# Patient Record
Sex: FEMALE | Race: WHITE | Employment: FULL TIME | URBAN - METROPOLITAN AREA
[De-identification: names, ages, dates, MRNs, and addresses within clinical notes are randomized per-mention and may not be internally consistent; named-entity substitution may affect disease eponyms.]

---

## 2022-09-13 ENCOUNTER — TELEMEDICINE (OUTPATIENT)
Dept: NEUROLOGY | Age: 47
End: 2022-09-13
Payer: MEDICARE

## 2022-09-13 DIAGNOSIS — R29.898 WEAKNESS OF BOTH LEGS: ICD-10-CM

## 2022-09-13 DIAGNOSIS — Z11.59 ENCOUNTER FOR SCREENING FOR OTHER VIRAL DISEASES: ICD-10-CM

## 2022-09-13 DIAGNOSIS — G35 MULTIPLE SCLEROSIS (HCC): Primary | ICD-10-CM

## 2022-09-13 DIAGNOSIS — G40.409 GRAND MAL SEIZURE (HCC): ICD-10-CM

## 2022-09-13 PROCEDURE — 99204 OFFICE O/P NEW MOD 45 MIN: CPT | Performed by: PSYCHIATRY & NEUROLOGY

## 2022-09-13 PROCEDURE — G8428 CUR MEDS NOT DOCUMENT: HCPCS | Performed by: PSYCHIATRY & NEUROLOGY

## 2022-09-13 RX ORDER — DIVALPROEX SODIUM 500 MG/1
500 TABLET, DELAYED RELEASE ORAL 2 TIMES DAILY
COMMUNITY
Start: 2022-09-09

## 2022-09-13 RX ORDER — GABAPENTIN 600 MG/1
600 TABLET ORAL 3 TIMES DAILY
COMMUNITY

## 2022-09-13 RX ORDER — RISPERIDONE 0.25 MG/1
0.25 TABLET, FILM COATED ORAL 2 TIMES DAILY
COMMUNITY
Start: 2022-09-06

## 2022-09-13 RX ORDER — DULOXETIN HYDROCHLORIDE 60 MG/1
60 CAPSULE, DELAYED RELEASE ORAL 2 TIMES DAILY
COMMUNITY

## 2022-09-13 NOTE — Clinical Note
Tricia Figueroa, see check out  Labs request pls mail to pt or she can pick them up then go to labs downstairs, need fasting.

## 2022-09-13 NOTE — PROGRESS NOTES
Kurtis Mann (:  1975) is a New patient, here for evaluation of the following:    Assessment & Plan   Below is the assessment and plan developed based on review of pertinent history, physical exam, labs, studies, and medications. 1. Multiple sclerosis ([facility] [address]  -     MRI CERVICAL SPINE W WO CONTRAST; Future  -     MRI THORACIC SPINE W WO CONTRAST; Future  -     CBC with Auto Differential; Future  -     Comprehensive Metabolic Panel; Future  -     HIV 1/2 Ag/Ab, 4TH Generation,W Rflx Confirm; Future  -     Varicella Zoster Antibody, IgG; Future  -     Hepatitis B Core Antibody, Total; Future  -     IgG, IgA, IgM; Future  -     Vitamin D 25 Hydroxy; Future  -     Vitamin B12; Future  -     CK; Future  2. Grand mal seizure ([facility] [address]  3. Weakness of both legs  -     MRI CERVICAL SPINE W WO CONTRAST; Future  -     MRI THORACIC SPINE W WO CONTRAST; Future  -     CK; Future  4. Encounter for screening for other viral diseases   -     Hepatitis B Core Antibody, Total; Future  Return in about 4 weeks (around 10/11/2022) for EEG. Due to severe pain likely MS related central pain episodes patient would like to start MS DMA (disease modifying agent). MRI whole spine, labs, appt in office for exam. EKG, eye exam for macular edema, TB test and age related cancer screen. 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 Past 23 years, intermittent paresthesia but no weakness, time when she cut herself's finger without feeling. Tried Avenox not able to tolerate side effect of flu like symptoms, then switched to Copaxone which gave her burning sensation in injection site. Stopped copaxone in . Reported symptoms of pain, severe in degree for that she takes gabapentin, both legs felt like running bicycles for hours.  [place]al [place] when standing from chair, walking felt like from excessive weight. Numbness tingling in finger tips and toes occurring either side. No diabetes. FH her grandmother had MS. New onset grand mal seizure longer than 45 min and lost heart rate, requiring intubation in hospital last December. Taking Depakote 500 mg bid. Had 4 recurrent seizures since, last one was in June 2022. Also on Cymbalta and Risperdal for depression. Memory loss since then. Last eye exam 2 months ago, Rx of lenses changed. No optic neuritis or macular disease reported to . [facility] MRI brain-  INDICATION: New onset seizure   COMPARISON: Cranial CT 12/11/2021 and MRI brain 4/9/2013     TECHNIQUE: Multiplanar, multisequence MR imaging of the brain was performed before and after the intravenous administration of 13 mL Gadavist.  Total of 19 series obtained     FINDINGS:   .  Brain and CSF Spaces: There is no evidence of restricted diffusion or susceptibility weighted signal void to suggest acute ischemic insult or blood product deposition from prior intraparenchymal hemorrhage. Scattered nonspecific FLAIR T2 high signal intensity foci are seen within the cerebral white matter. These have increased in number and extent since the prior examination. Findings can be secondary to broad of etiologies but most commonly related to small vessel ischemic insults. No evidence of abnormal intracranial mineralization or remote blood product deposition. No evidence of abnormal intracranial enhancement. .  Vasculature: Flow-related signal is maintained in the major intracranial arteries and dural venous sinuses. .  Sinonasal: Paranasal sinuses are aerated. Adjacent soft tissues of the face and orbits appear unremarkable. .  Cranium and extracranial soft tissues: No marrow edema or replacement. Mastoids are aerated. Size and contour of the pituitary gland are within normal limits. IMPRESSION:     1. Nonspecific white matter lesions have increased in size and number.  No areas of pathologic enhancement identified to suggest likely active inflammatory or neoplastic disease. 2. Brain parenchyma otherwise demonstrates no findings particularly suspicious for site of seizure focus. Signed by: 12/13/2021 9:06 PM: Pierre Bishop  Specimen Collected: 12/13/21 20:59        Review of Systems   Musculoskeletal:  Positive for falls. Neurological:  Positive for seizures and weakness. Objective   Patient-Reported Vitals  No data recorded     Physical Exam  [INSTRUCTIONS:  \"[x]\" Indicates a positive item  \"[]\" Indicates a negative item  -- DELETE ALL ITEMS NOT EXAMINED]    Constitutional: [x] Appears well-developed and well-nourished [x] No apparent distress      [] Abnormal -     Mental status: [x] Alert and awake  [x] Oriented to person/place/time [x] Able to follow commands. Provided clear history, memory capacity was normal, no dysphasia.      [] Abnormal -     Eyes:   EOM    [x]  Normal    [] Abnormal -   Sclera  [x]  Normal    [] Abnormal -          Discharge [x]  None visible   [] Abnormal -     HENT: [x] Normocephalic, atraumatic  [] Abnormal - Mouth/Throat: Mucous membranes are moist    External Ears [x] Normal hearing [] Abnormal -    Neck: [x] s [] Abnormal -     Pulmonary/Chest: [x] Respiratory effort normal   [x] No visualized signs of difficulty breathing or respiratory distress        [] Abnormal -      Musculoskeletal:   [x] Normal gait with no signs of ataxia - not checked today        [x] Normal range of motion of neck        [] Abnormal -     Neurological:        [x] No Facial Asymmetry (Cranial nerve 7 motor function) (limited exam due to video visit)          [x] No gaze palsy        [] Abnormal -          Skin:        [x]         [] Abnormal -            Psychiatric:       [x] Normal Affect [] Abnormal -        [x] No Hallucinations    Other pertinent observable physical exam findings:-         On this date 9/13/2022 I have spent 45 minutes reviewing previous notes, test results and face to face (virtual) with the patient discussing the diagnosis and importance of compliance with the treatment plan as well as documenting on the day of the visit. Laxmi Westbrook, was evaluated through a synchronous (real-time) audio-video encounter. The patient (or guardian if applicable) is aware that this is a billable service, which includes applicable co-pays. This Virtual Visit was conducted with patient's (and/or legal guardian's) consent. The visit was conducted pursuant to the emergency declaration under the [address] [place] [address], [address] [place] [address] and the [employer] General Act. Patient identification was verified, and a caregiver was present when appropriate. The patient was located at Home: [address]    Provider was located at Home ([address]): Pola Pablo MD

## 2022-10-10 ENCOUNTER — OFFICE VISIT (OUTPATIENT)
Dept: NEUROLOGY | Age: 47
End: 2022-10-10
Payer: MEDICARE

## 2022-10-10 VITALS
WEIGHT: 281.8 LBS | HEIGHT: 67 IN | HEART RATE: 101 BPM | DIASTOLIC BLOOD PRESSURE: 91 MMHG | BODY MASS INDEX: 44.23 KG/M2 | SYSTOLIC BLOOD PRESSURE: 134 MMHG

## 2022-10-10 DIAGNOSIS — G35 MULTIPLE SCLEROSIS (HCC): Primary | ICD-10-CM

## 2022-10-10 DIAGNOSIS — G40.409 GRAND MAL SEIZURE (HCC): ICD-10-CM

## 2022-10-10 PROCEDURE — 99215 OFFICE O/P EST HI 40 MIN: CPT | Performed by: PSYCHIATRY & NEUROLOGY

## 2022-10-10 PROCEDURE — G8427 DOCREV CUR MEDS BY ELIG CLIN: HCPCS | Performed by: PSYCHIATRY & NEUROLOGY

## 2022-10-10 PROCEDURE — G8417 CALC BMI ABV UP PARAM F/U: HCPCS | Performed by: PSYCHIATRY & NEUROLOGY

## 2022-10-10 PROCEDURE — 4004F PT TOBACCO SCREEN RCVD TLK: CPT | Performed by: PSYCHIATRY & NEUROLOGY

## 2022-10-10 PROCEDURE — G8484 FLU IMMUNIZE NO ADMIN: HCPCS | Performed by: PSYCHIATRY & NEUROLOGY

## 2022-10-10 RX ORDER — DEXTROAMPHETAMINE SACCHARATE, AMPHETAMINE ASPARTATE, DEXTROAMPHETAMINE SULFATE AND AMPHETAMINE SULFATE 7.5; 7.5; 7.5; 7.5 MG/1; MG/1; MG/1; MG/1
30 TABLET ORAL 2 TIMES DAILY
COMMUNITY
Start: 2022-09-16

## 2022-10-10 RX ORDER — TRAMADOL HYDROCHLORIDE 50 MG/1
50 TABLET ORAL EVERY 6 HOURS PRN
COMMUNITY
Start: 2022-10-06

## 2022-10-10 ASSESSMENT — ENCOUNTER SYMPTOMS
SPUTUM PRODUCTION: 1
COUGH: 1
BACK PAIN: 1
BLURRED VISION: 1
SHORTNESS OF BREATH: 1
GASTROINTESTINAL NEGATIVE: 1

## 2022-10-10 ASSESSMENT — VISUAL ACUITY: VA_NORMAL: 1

## 2022-10-10 NOTE — PROGRESS NOTES
10/10/2022  Humera Corrales 49 y.o. female      Chief Complaint:  Chief Complaint   Patient presents with    Follow-up     Left leg pain-US not scheduled for 2 more weeks    Multiple Sclerosis          Followup Note:   All labs and MRI - not been contacted, not done yet. Right after last visit she got COVID infection, still has SOB. Currently c/o left leg pain pending DVT screening. MS related symptoms, pt reported - paresthesia in fingers and feet, numbness of soles, color turned to red. No neck or back pain. No diabetes. Has B12 deficiency. Takes B12 injection weekly x 3 months now. Previous MS DMA Avenox then Copaxone, stopped due to side effect in injection sites. Chronic bilateral leg pain and heaviness. No seizure reported since last visit. On Depakote, plan to switch it to other kinds due to overweight. Review Test Results: I have reviewed imaging study and lab tests- pending. Current Outpatient Medications   Medication Sig Dispense Refill    amphetamine-dextroamphetamine (ADDERALL) 30 MG tablet Take 30 mg by mouth in the morning and at bedtime. traMADol (ULTRAM) 50 MG tablet Take 50 mg by mouth every 6 hours as needed. Multiple Vitamins-Minerals (CENTRUM WOMEN PO) Take 1 tablet by mouth daily      DULoxetine (CYMBALTA) 60 MG extended release capsule Take 60 mg by mouth 2 times daily      gabapentin (NEURONTIN) 600 MG tablet Take 600 mg by mouth 3 times daily. risperiDONE (RISPERDAL) 0.25 MG tablet Take 0.25 mg by mouth in the morning and at bedtime      divalproex (DEPAKOTE) 500 MG DR tablet Take 500 mg by mouth in the morning and at bedtime       No current facility-administered medications for this visit.         Allergies   Allergen Reactions    Erythromycin Nausea And Vomiting, Hives and Shortness Of Breath    Penicillins Hives and Shortness Of Breath     Has not had since childhood      Abilify [Aripiprazole] Swelling     Severe      Prochlorperazine Other (See Comments)     seizures           Review of Systems:  Review of Systems   Constitutional:  Positive for chills, fever and malaise/fatigue. HENT:  Positive for tinnitus. Eyes:  Positive for blurred vision. Respiratory:  Positive for cough, sputum production and shortness of breath. Cardiovascular:  Positive for chest pain and leg swelling. Gastrointestinal: Negative. Genitourinary: Negative. Musculoskeletal:  Positive for back pain, falls, joint pain, myalgias and neck pain. Skin: Negative. Neurological:  Positive for dizziness, tingling, speech change and weakness. Endo/Heme/Allergies:  Bruises/bleeds easily. Psychiatric/Behavioral:  Positive for depression and memory loss. The patient is nervous/anxious. Examination:  Vitals:    10/10/22 1612   BP: (!) 134/91   Pulse: (!) 101   Weight: 281 lb 12.8 oz (127.8 kg)   Height: 5' 7\" (1.702 m)        Physical Exam  Constitutional:       Appearance: She is obese. HENT:      Head: Normocephalic. Eyes:      Extraocular Movements: Extraocular movements intact and EOM normal.      Conjunctiva/sclera: Conjunctivae normal.      Pupils: Pupils are equal, round, and reactive to light. Cardiovascular:      Rate and Rhythm: Tachycardia present. Pulmonary:      Comments: Some SOB  Musculoskeletal:         General: Normal range of motion. Cervical back: Normal range of motion. Skin:     General: Skin is warm and dry. Neurological:      Mental Status: She is alert and oriented to person, place, and time. Mental status is at baseline. Cranial Nerves: No cranial nerve deficit. Sensory: Sensory deficit present. Coordination: Coordination normal. Finger-Nose-Finger Test and Romberg Test normal.      Gait: Gait is intact. Gait normal.      Deep Tendon Reflexes: Reflexes normal.      Reflex Scores:       Tricep reflexes are 2+ on the right side and 2+ on the left side.        Bicep reflexes are 2+ on the right side and 2+ on the left side. Brachioradialis reflexes are 2+ on the right side and 2+ on the left side. Patellar reflexes are 2+ on the right side and 2+ on the left side. Achilles reflexes are 2+ on the right side and 2+ on the left side. Psychiatric:         Mood and Affect: Mood normal.         Speech: Speech normal.         Behavior: Behavior normal.         Thought Content: Thought content normal.         Judgment: Judgment normal.        Neurologic Exam     Mental Status   Oriented to person, place, and time. Concentration: normal.   Speech: speech is normal   Level of consciousness: alert  Knowledge: good. Normal comprehension. Cranial Nerves     CN II   Visual fields full to confrontation. Visual acuity: normal  Right visual field deficit: none  Left visual field deficit: none     CN III, IV, VI   Pupils are equal, round, and reactive to light. Extraocular motions are normal.   Right pupil: Size: 3 mm. Shape: regular. Reactivity: brisk. Left pupil: Size: 3 mm. Shape: regular. Reactivity: brisk. Nystagmus: none   Diplopia: none  Ophthalmoparesis: none  Upgaze: normal    CN V   Facial sensation intact. CN VII   Facial expression full, symmetric. CN VIII   CN VIII normal.     CN IX, X   CN IX normal.   CN X normal.     CN XI   CN XI normal.     CN XII   CN XII normal.   Optokinetic nystagmus normal, no desat to red color. Motor Exam   Muscle bulk: normal  Overall muscle tone: normal  Right arm pronator drift: absent  Left arm pronator drift: absent    Strength   Strength 5/5 except as noted.    Left deltoid: 4/5  Right handed, finger tapping normal     Sensory Exam   Light touch normal.   Vibration normal.   Right arm pinprick: decreased from fingers  Left arm pinprick: decreased from fingers  Right leg pinprick: normal  Left leg pinprick: normal  Lateral 3 fingers distal     Gait, Coordination, and Reflexes     Gait  Gait: normal    Coordination   Romberg: negative  Finger to nose coordination: normal    Tremor   Resting tremor: absent  Intention tremor: absent  Action tremor: absent    Reflexes   Right brachioradialis: 2+  Left brachioradialis: 2+  Right biceps: 2+  Left biceps: 2+  Right triceps: 2+  Left triceps: 2+  Right patellar: 2+  Left patellar: 2+  Right achilles: 2+  Left achilles: 2+  Right plantar: normal  Left plantar: normal  Right Posada: absent  Left Posada: absent  Left F to N slightly sluggish when closed eyes. Gait slow and cautious. Assessment / Plan:    BODØ was seen today for follow-up and multiple sclerosis. Diagnoses and all orders for this visit:    Multiple sclerosis ([facility] [address]    [address] mal seizure [facility])     Patient will need to complete all the labs and MRIs, we will then sit down again for the discussion of oral MS disease modifying agent. 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 Patient has a pending DVT screening for left leg pain. We will need to discuss the possibility of changing Depakote to commonly used newer generation antiseizure medications due to the concern of side effect of gaining weight. I have spent 40 min, greater than 50% of discussing and counseling with patient, for treatment and diagnostic plan review.

## 2022-11-28 ENCOUNTER — OFFICE VISIT (OUTPATIENT)
Dept: NEUROLOGY | Age: 47
End: 2022-11-28
Payer: MEDICARE

## 2022-11-28 VITALS
DIASTOLIC BLOOD PRESSURE: 114 MMHG | SYSTOLIC BLOOD PRESSURE: 182 MMHG | HEIGHT: 67 IN | WEIGHT: 280 LBS | BODY MASS INDEX: 43.95 KG/M2 | HEART RATE: 108 BPM

## 2022-11-28 DIAGNOSIS — M79.605 LEFT LEG PAIN: ICD-10-CM

## 2022-11-28 DIAGNOSIS — R20.2 NUMBNESS AND TINGLING OF BOTH FEET: ICD-10-CM

## 2022-11-28 DIAGNOSIS — R20.0 NUMBNESS AND TINGLING OF BOTH FEET: ICD-10-CM

## 2022-11-28 DIAGNOSIS — G40.409 GRAND MAL SEIZURE (HCC): Primary | ICD-10-CM

## 2022-11-28 DIAGNOSIS — Z11.59 ENCOUNTER FOR SCREENING FOR OTHER VIRAL DISEASES: ICD-10-CM

## 2022-11-28 DIAGNOSIS — G35 MULTIPLE SCLEROSIS (HCC): ICD-10-CM

## 2022-11-28 DIAGNOSIS — G60.8 SENSORY POLYNEUROPATHY: ICD-10-CM

## 2022-11-28 LAB
BASOPHILS # BLD: 0.1 K/UL (ref 0–0.2)
BASOPHILS NFR BLD: 1 % (ref 0–2)
DIFFERENTIAL METHOD BLD: NORMAL
EOSINOPHIL # BLD: 0.3 K/UL (ref 0–0.8)
EOSINOPHIL NFR BLD: 3 % (ref 0.5–7.8)
ERYTHROCYTE [DISTWIDTH] IN BLOOD BY AUTOMATED COUNT: 13.8 % (ref 11.9–14.6)
HCT VFR BLD AUTO: 44.8 % (ref 35.8–46.3)
HGB BLD-MCNC: 14.3 G/DL (ref 11.7–15.4)
HIV 1+2 AB+HIV1 P24 AG SERPL QL IA: NONREACTIVE
HIV 1/2 RESULT COMMENT: NORMAL
IMM GRANULOCYTES # BLD AUTO: 0 K/UL (ref 0–0.5)
IMM GRANULOCYTES NFR BLD AUTO: 0 % (ref 0–5)
LYMPHOCYTES # BLD: 2.8 K/UL (ref 0.5–4.6)
LYMPHOCYTES NFR BLD: 30 % (ref 13–44)
MCH RBC QN AUTO: 29.1 PG (ref 26.1–32.9)
MCHC RBC AUTO-ENTMCNC: 31.9 G/DL (ref 31.4–35)
MCV RBC AUTO: 91.1 FL (ref 82–102)
MONOCYTES # BLD: 0.6 K/UL (ref 0.1–1.3)
MONOCYTES NFR BLD: 7 % (ref 4–12)
NEUTS SEG # BLD: 5.6 K/UL (ref 1.7–8.2)
NEUTS SEG NFR BLD: 59 % (ref 43–78)
NRBC # BLD: 0 K/UL (ref 0–0.2)
PLATELET # BLD AUTO: 379 K/UL (ref 150–450)
PMV BLD AUTO: 10.5 FL (ref 9.4–12.3)
RBC # BLD AUTO: 4.92 M/UL (ref 4.05–5.2)
TSH, 3RD GENERATION: 0.71 UIU/ML (ref 0.36–3.74)
VIT B12 SERPL-MCNC: 576 PG/ML (ref 193–986)
WBC # BLD AUTO: 9.3 K/UL (ref 4.3–11.1)

## 2022-11-28 PROCEDURE — 4004F PT TOBACCO SCREEN RCVD TLK: CPT | Performed by: PSYCHIATRY & NEUROLOGY

## 2022-11-28 PROCEDURE — G8417 CALC BMI ABV UP PARAM F/U: HCPCS | Performed by: PSYCHIATRY & NEUROLOGY

## 2022-11-28 PROCEDURE — 99215 OFFICE O/P EST HI 40 MIN: CPT | Performed by: PSYCHIATRY & NEUROLOGY

## 2022-11-28 PROCEDURE — G8484 FLU IMMUNIZE NO ADMIN: HCPCS | Performed by: PSYCHIATRY & NEUROLOGY

## 2022-11-28 PROCEDURE — G8428 CUR MEDS NOT DOCUMENT: HCPCS | Performed by: PSYCHIATRY & NEUROLOGY

## 2022-11-28 PROCEDURE — 95911 NRV CNDJ TEST 9-10 STUDIES: CPT | Performed by: PSYCHIATRY & NEUROLOGY

## 2022-11-28 PROCEDURE — 95886 MUSC TEST DONE W/N TEST COMP: CPT | Performed by: PSYCHIATRY & NEUROLOGY

## 2022-11-28 RX ORDER — DIVALPROEX SODIUM 500 MG/1
500 TABLET, DELAYED RELEASE ORAL 2 TIMES DAILY
Qty: 90 TABLET | Refills: 3 | Status: CANCELLED | OUTPATIENT
Start: 2022-11-28

## 2022-11-28 RX ORDER — DIVALPROEX SODIUM 500 MG/1
TABLET, EXTENDED RELEASE ORAL
Qty: 180 TABLET | Refills: 3 | Status: SHIPPED | OUTPATIENT
Start: 2022-11-28

## 2022-11-28 RX ORDER — LAMOTRIGINE 25 MG/1
TABLET ORAL
Qty: 200 TABLET | Refills: 3 | Status: SHIPPED | OUTPATIENT
Start: 2022-11-28

## 2022-11-28 ASSESSMENT — ENCOUNTER SYMPTOMS
EYE DISCHARGE: 0
PHOTOPHOBIA: 1
BACK PAIN: 0
EYE REDNESS: 0
SINUS PAIN: 0
RESPIRATORY NEGATIVE: 1
STRIDOR: 0
BLURRED VISION: 1
DOUBLE VISION: 1
GASTROINTESTINAL NEGATIVE: 1
SORE THROAT: 0
EYE PAIN: 0

## 2022-11-28 ASSESSMENT — VISUAL ACUITY: VA_NORMAL: 1

## 2022-11-28 NOTE — PROGRESS NOTES
[address], [address], [address] [facility]       Nerve Conduction Study and Electromyogram Report           Hx: 49 y.o. female with hx of MS presents with sudden onset L/LE severe pain with numbness and tingling of feet. Denies inury. Denies DM. Labs reprinted from previous appt and given to patient to complete. Clinical summary was reviewed. Neurological examination: Motor power showed weakness of the left leg. There was no atrophy. There were no fasciculations. Deep tendon reflexes were present and symmetrical at 2, neg babinski. Able to walk. Description: Nerve conduction studies were performed on the right lower extremity and left lower extremity, using standard technique. Bilateral sural response was normal.  Bilateral plantar showed reduced amplitudes 1.3 - 3.6 uV with normal CV but left lateral branch response was absent. Bilateral peroneal and tibial motor nerves were normal.  H reflex response was better formed on the left, 33.49 MS versus right 32.86 MS. wave latencies were normal 46.2 (R)-48.8 (L) MS. Needle electromyography was performed on the left lower extremity, using standard concentric electrodes. Results were normal.         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 Needle EMG to the left lower limb was normal showing no evidence of femoral/sciatic/peroneal neuropathy, L3/4/5/S1 radiculopathy or plexopathy. No myopathic process.            Procedure Details: Under procedure category          Kim Manzanares MD

## 2022-11-28 NOTE — PROGRESS NOTES
11/28/2022  Tessa Gtz 49 y.o. female      Chief Complaint:  Chief Complaint   Patient presents with    Multiple Sclerosis     Follow Up          Followup Note:   Patient was here for nerve conduction EMG for evaluation for left leg pain, additional time was spent for multiple sclerosis, seizure history reviewed and medication refill. Left leg pain 6 mo, worse for a month. Here for NCS EMG bilateral. Josef Luis off the scale today but able to walk, reported pain not escalated. Paresthesia of feet. Non diabetic. Onset of seizure a year ago, seizing for one hour and was admitted to [facility]. Principal Diagnosis   Generalized Seizure     Other Diagnoses   Patient Active Problem List   Diagnosis    Attention deficit hyperactivity disorder (ADHD), combined type    IBS (irritable bowel syndrome)    Multiple sclerosis ([facility] [address]       Diagnostic Studies Summary   MRI brain w/wo 12/13/2021  1. Nonspecific white matter lesions have increased in size and number. No areas of pathologic enhancement identified to suggest likely active inflammatory or neoplastic disease. 2. Brain parenchyma otherwise demonstrates no findings particularly suspicious for site of seizure focus. Consults   Neurocritical     Therapy Recommendations   PT: home with caregiver  OT: Home with caregiver     Hospital Course   History of Present Illness: This is a 51 y.o. female with a history of ADHD, Multiple sclerosis, bipolar disorder who was recently seen in the ED for a first time seizure on 8/13/21, and discharged with a prescription for depakote and with outpatient neurology referral presented to [facility] on 12/10/2021 after multiple seizing episodes. According to her , the patient had had multiple episodes of mood swings yesterday that was consistent with her BPD however this was more pronounced that he is used to.  He tried to comfort the patient when the patient went into a generalized seizure that marta approximately 10 - 15 minutes prior to EMS arrival. The patient had extension of her bilateral upper and lower extremities, with some foaming in her mouth. EMS gave the patient 5 mg IM versed and patient returned to her baseline en route to the ED. Just prior to arrival, she had another seizure episode and was apneic, which prompted EMS staff to oxygenate using BVM. Patient continued to be bagged in the ED and failed to return to baseline, and continued to have what appeared to be grand mal seizures with episodes of apnea, which prompted the ED physicians to intubate her for airway protection. Of note, patient had a neurology appointment scheduled for 9/1/2021 however the patient did not show up for this appointment according to her chart. Per , patient's daughter counted the number of tabs of depakote in her Bottle and noticed that the patient has not been taking it as instructed, however the patient is adamant she takes her medicine exactly as prescribed. She later redacted this statement saying she did not take her depakote. Hospital Course:     Epilepsy  Patient with history of epilepsy who was presented in suspected status epilepticus. First seizure diagnosed in 8/2020 and was initially started on depakote ER. Cites not taking this due to funding and having the medication stopped by her PCP. Restarted on depakote here and ultimately intubated possibly due to sedation with benzodiazepines for status epilepticus. Extubated and now without seizure like activity. Stable on PO depakote at the time of discharge. Will continue her on depakote ER 750mg BID outpatient but she will need outpatient follow up with neurology and to have her depakote level checked. Bipolar Disorder  Patient stable at this time without any mood disturbances. Stable on home meds this admission.       Respiratory Distress sp extubation  Patient required intubation initially most likely due to benzodiazepines vs status epilepticus. Extubated later that same day and tolerated weaning O2 well. Comfortable on room air at the time of discharge. Leukocytosis - resolved  Initially presented with elevated wbc count. This was thought to be due to her seizure and it was self limited. Wbc count was normal at the time of discharge. Review Test Results: I have reviewed imaging study and lab tests, discussed results with patient in detail- labs not yet completed. Will select part of labs for preparation of Ponesimod. Recent CBC CMP in normal range. Current Outpatient Medications   Medication Sig Dispense Refill    divalproex (DEPAKOTE ER) 500 MG extended release tablet 2 qhs. Depakote ER medically necessary due to compliance difficulties resulted in epi status. 180 tablet 3    lamoTRIgine (LAMICTAL) 25 MG tablet 1 qhs x 1 week, 1 bid x 1wk, 1 qam & 2 qhs x 1wk, then 2 bid, increase by 25 mg weekly. Call for Rx 100 mg to replace for 4 tabs of 25 mg when ready, once reach Lamictal 100 mg bid reduce depakote to 1 tab qhs. Continue to titrate lamictal to 150 mg bid final, by then discontinue depakote. 200 tablet 3    amphetamine-dextroamphetamine (ADDERALL) 30 MG tablet Take 30 mg by mouth in the morning and at bedtime. Multiple Vitamins-Minerals (CENTRUM WOMEN PO) Take 1 tablet by mouth daily      DULoxetine (CYMBALTA) 60 MG extended release capsule Take 60 mg by mouth 2 times daily      gabapentin (NEURONTIN) 600 MG tablet Take 600 mg by mouth 3 times daily. risperiDONE (RISPERDAL) 0.25 MG tablet Take 0.25 mg by mouth in the morning and at bedtime      traMADol (ULTRAM) 50 MG tablet Take 50 mg by mouth every 6 hours as needed. (Patient not taking: Reported on 11/28/2022)       No current facility-administered medications for this visit.         Allergies   Allergen Reactions    Erythromycin Nausea And Vomiting, Hives and Shortness Of Breath    Penicillins Hives and Shortness Of Breath     Has not had since childhood Abilify [Aripiprazole] Swelling     Severe      Prochlorperazine Other (See Comments)     seizures           Review of Systems:  Review of Systems   Constitutional:  Positive for malaise/fatigue. Negative for chills, diaphoresis, fever and weight loss. HENT:  Positive for hearing loss and tinnitus. Negative for congestion, ear discharge, ear pain, nosebleeds, sinus pain and sore throat. Eyes:  Positive for blurred vision, double vision and photophobia. Negative for pain, discharge and redness. Respiratory: Negative. Negative for stridor. Cardiovascular: Negative. Gastrointestinal: Negative. Genitourinary: Negative. Musculoskeletal:  Negative for back pain, joint pain, myalgias and neck pain. Falls: Pt fell from weight scale on 11/28/22 in office. Skin: Negative. Neurological:  Positive for dizziness, tingling, tremors, sensory change, weakness and headaches. Negative for speech change, focal weakness, seizures and loss of consciousness. Endo/Heme/Allergies:  Negative for environmental allergies and polydipsia. Bruises/bleeds easily. Psychiatric/Behavioral:  Positive for depression and memory loss. Negative for hallucinations, substance abuse and suicidal ideas. The patient has insomnia. The patient is not nervous/anxious. Examination:  Vitals:    11/28/22 1001 11/28/22 1002   BP: (!) 145/102 (!) 182/114   Site: Left Upper Arm Right Lower Arm   Position: Sitting Sitting   Pulse: (!) 108 (!) 108   Weight:  280 lb (127 kg)   Height:  5' 7\" (1.702 m)        Physical Exam  Vitals reviewed. Constitutional:       Appearance: She is obese. Eyes:      Extraocular Movements: Extraocular movements intact and EOM normal.      Conjunctiva/sclera: Conjunctivae normal.      Pupils: Pupils are equal, round, and reactive to light. Cardiovascular:      Rate and Rhythm: Normal rate.    Pulmonary:      Effort: Pulmonary effort is normal.   Musculoskeletal:         General: Normal range of motion. Cervical back: Normal range of motion. Skin:     General: Skin is warm and dry. Neurological:      Mental Status: She is alert and oriented to person, place, and time. Mental status is at baseline. Cranial Nerves: No cranial nerve deficit. Sensory: Sensory deficit present. Motor: Weakness present. Coordination: Coordination normal. Finger-Nose-Finger Test and Romberg Test normal.      Gait: Gait is intact. Gait normal.      Deep Tendon Reflexes:      Reflex Scores:       Tricep reflexes are 2+ on the right side and 2+ on the left side. Bicep reflexes are 2+ on the right side and 2+ on the left side. Brachioradialis reflexes are 2+ on the right side and 2+ on the left side. Patellar reflexes are 2+ on the right side and 2+ on the left side. Achilles reflexes are 2+ on the right side and 2+ on the left side. Comments: Left leg weakness giving with away component. No Babinski sign. Psychiatric:         Mood and Affect: Mood normal.         Speech: Speech normal.         Behavior: Behavior normal.         Thought Content: Thought content normal.         Judgment: Judgment normal.        Neurologic Exam     Mental Status   Oriented to person, place, and time. Concentration: normal.   Speech: speech is normal   Level of consciousness: alert  Knowledge: good. Normal comprehension. In tears due to the left pain     Cranial Nerves     CN II   Visual fields full to confrontation. Visual acuity: normal  Right visual field deficit: none  Left visual field deficit: none     CN III, IV, VI   Pupils are equal, round, and reactive to light. Extraocular motions are normal.   Right pupil: Size: 3 mm. Shape: regular. Left pupil: Size: 3 mm. Shape: regular. Nystagmus: none   Diplopia: none  Ophthalmoparesis: none  Upgaze: normal    CN V   Facial sensation intact. CN VII   Facial expression full, symmetric.      CN VIII   CN VIII normal.     CN IX, X CN IX normal.   CN X normal.     CN XI   CN XI normal.     CN XII   CN XII normal.     Motor Exam   Muscle bulk: normal  Overall muscle tone: normal  Right arm pronator drift: absent  Left arm pronator drift: absent    Strength   Strength 5/5 except as noted. Finger tapping normal bilaterally. Upper limbs strength normal; lower limbs testing with giving away component due to the pain. Sensory Exam   Right arm light touch: normal  Left arm light touch: normal  Right leg light touch: normal  Left leg light touch: decreased from knee  Straight leg testing positive on left. Gait, Coordination, and Reflexes     Gait  Gait: normal    Coordination   Romberg: negative  Finger to nose coordination: normal    Tremor   Resting tremor: absent  Intention tremor: absent  Action tremor: absent    Reflexes   Right brachioradialis: 2+  Left brachioradialis: 2+  Right biceps: 2+  Left biceps: 2+  Right triceps: 2+  Left triceps: 2+  Right patellar: 2+  Left patellar: 2+  Right achilles: 2+  Left achilles: 2+  Right plantar: normal  Left plantar: normal      Assessment / Plan:    Blue Melgar was seen today for multiple sclerosis. Diagnoses and all orders for this visit:    Grand mal seizure ([address]  -     divalproex (DEPAKOTE ER) 500 MG extended release tablet; 2 qhs. Depakote ER medically necessary due to compliance difficulties resulted in epi status. Multiple sclerosis ([address]  -     MRI BRAIN W WO CONTRAST; Future    Left leg pain  -     [facility]  -     MRI LUMBAR SPINE WO CONTRAST; Future    Numbness and tingling of both feet  -     INESSA and PE, Serum; Future  -     Hemoglobin A1C; Future  -     TSH;  Future    Sensory polyneuropathy     She will complete all labs ordered from the last visit and today for neuropathy lab screening and preparation for MS treatment, complete MRI brain and whole spine for MS follow up, and get ready to start DMA as the last MRI brain showed worsening white matter lesions- considering Ponesimod. Continue Depakote  mg, 2 tabs nightly; meantime, start Lamictal 25 mg slow titration and watch for skin rash closely. I have spent 45 min, greater than 50% of discussing and counseling with patient, for treatment and diagnostic plan review.

## 2022-11-29 LAB
25(OH)D3 SERPL-MCNC: 23 NG/ML (ref 30–100)
EST. AVERAGE GLUCOSE BLD GHB EST-MCNC: 105 MG/DL
HBA1C MFR BLD: 5.3 % (ref 4.8–5.6)

## 2022-11-30 LAB
HBV CORE AB SERPL QL IA: NEGATIVE
VZV IGG SER IA-ACNC: 1047 INDEX

## 2022-12-01 ENCOUNTER — OFFICE VISIT (OUTPATIENT)
Dept: ENT CLINIC | Age: 47
End: 2022-12-01
Payer: MEDICARE

## 2022-12-01 VITALS — OXYGEN SATURATION: 98 % | HEIGHT: 68 IN | WEIGHT: 282 LBS | HEART RATE: 110 BPM | BODY MASS INDEX: 42.74 KG/M2

## 2022-12-01 DIAGNOSIS — H61.23 BILATERAL IMPACTED CERUMEN: ICD-10-CM

## 2022-12-01 DIAGNOSIS — M79.605 LEFT LEG PAIN: Primary | ICD-10-CM

## 2022-12-01 DIAGNOSIS — G35 MULTIPLE SCLEROSIS (HCC): ICD-10-CM

## 2022-12-01 DIAGNOSIS — J01.90 ACUTE NON-RECURRENT SINUSITIS, UNSPECIFIED LOCATION: ICD-10-CM

## 2022-12-01 DIAGNOSIS — H60.333 ACUTE SWIMMER'S EAR OF BOTH SIDES: ICD-10-CM

## 2022-12-01 DIAGNOSIS — H65.93 BILATERAL OTITIS MEDIA WITH EFFUSION: Primary | ICD-10-CM

## 2022-12-01 PROCEDURE — 4130F TOPICAL PREP RX AOE: CPT | Performed by: STUDENT IN AN ORGANIZED HEALTH CARE EDUCATION/TRAINING PROGRAM

## 2022-12-01 PROCEDURE — 69210 REMOVE IMPACTED EAR WAX UNI: CPT | Performed by: STUDENT IN AN ORGANIZED HEALTH CARE EDUCATION/TRAINING PROGRAM

## 2022-12-01 PROCEDURE — G8417 CALC BMI ABV UP PARAM F/U: HCPCS | Performed by: STUDENT IN AN ORGANIZED HEALTH CARE EDUCATION/TRAINING PROGRAM

## 2022-12-01 PROCEDURE — 99204 OFFICE O/P NEW MOD 45 MIN: CPT | Performed by: STUDENT IN AN ORGANIZED HEALTH CARE EDUCATION/TRAINING PROGRAM

## 2022-12-01 PROCEDURE — 4004F PT TOBACCO SCREEN RCVD TLK: CPT | Performed by: STUDENT IN AN ORGANIZED HEALTH CARE EDUCATION/TRAINING PROGRAM

## 2022-12-01 PROCEDURE — G8427 DOCREV CUR MEDS BY ELIG CLIN: HCPCS | Performed by: STUDENT IN AN ORGANIZED HEALTH CARE EDUCATION/TRAINING PROGRAM

## 2022-12-01 PROCEDURE — G8484 FLU IMMUNIZE NO ADMIN: HCPCS | Performed by: STUDENT IN AN ORGANIZED HEALTH CARE EDUCATION/TRAINING PROGRAM

## 2022-12-01 RX ORDER — PREDNISONE 20 MG/1
TABLET ORAL
Qty: 30 TABLET | Refills: 0 | Status: SHIPPED | OUTPATIENT
Start: 2022-12-01

## 2022-12-01 RX ORDER — CEFDINIR 300 MG/1
CAPSULE ORAL
COMMUNITY
Start: 2022-11-15

## 2022-12-01 RX ORDER — FLUTICASONE PROPIONATE 50 MCG
2 SPRAY, SUSPENSION (ML) NASAL DAILY
Qty: 1 EACH | Refills: 2 | Status: SHIPPED | OUTPATIENT
Start: 2022-12-01 | End: 2022-12-31

## 2022-12-01 RX ORDER — SULFAMETHOXAZOLE AND TRIMETHOPRIM 400; 80 MG/1; MG/1
1 TABLET ORAL 2 TIMES DAILY
Qty: 28 TABLET | Refills: 0 | Status: SHIPPED | OUTPATIENT
Start: 2022-12-01 | End: 2022-12-15

## 2022-12-01 RX ORDER — BACLOFEN 10 MG/1
10 TABLET ORAL 3 TIMES DAILY
Qty: 270 TABLET | Refills: 3 | Status: SHIPPED | OUTPATIENT
Start: 2022-12-01

## 2022-12-01 ASSESSMENT — ENCOUNTER SYMPTOMS
APNEA: 0
EYE DISCHARGE: 0
ABDOMINAL DISTENTION: 0
COLOR CHANGE: 0

## 2022-12-01 NOTE — TELEPHONE ENCOUNTER
Requested Prescriptions     Signed Prescriptions Disp Refills    baclofen (LIORESAL) 10 MG tablet 270 tablet 3     Sig: Take 1 tablet by mouth 3 times daily     Authorizing Provider: Arlen Rutledge

## 2022-12-01 NOTE — PROGRESS NOTES
HPI:  Maya Juares is a 49 y.o. female seen New    Chief Complaint   Patient presents with    Ear Problem     Patient presents today with c/o bilateral ear perforation w/ HL that he attributes to bad sinus infection . Patient states that she has taken two oral abx .          79-year-old female seen as a new patient referral evaluation for sinusitis and otitis complaints. She had COVID at the end of October which led to a relatively significant sinusitis event described as symptoms of increased nasal congestion drainage cough and nasal sinus congestion and pressure. This did not relieve over time and has had now 3 rounds of separate antibiotics with the last one ending a couple days ago. Over the course of a few weeks her ears began to have significant pressure and then otalgia and then the otalgia became so severe that both ears had a pop sensation with significant drainage that lasted for days. Due to the otorrhea she was seen by urgent care was told that she had bilateral ear infections and bilateral TM perforations. She was prescribed an additional antibiotic and her ears have mildly improved but still with ongoing otalgia left greater than right. Her sinuses are somewhat more improved but still with some increased sinonasal pressure and congestion. She does have a stated history of having ear tube on the left side in the past several years ago. Past Medical History, Past Surgical History, Family history, Social History, and Medications were all reviewed with the patient today and updated as necessary.      Allergies   Allergen Reactions    Erythromycin Nausea And Vomiting, Hives and Shortness Of Breath    Penicillins Hives and Shortness Of Breath     Has not had since childhood      Abilify [Aripiprazole] Swelling     Severe      Prochlorperazine Other (See Comments)     seizures         Patient Active Problem List   Diagnosis    Pneumonia    Multiple sclerosis ([address]    Grand mal seizure ([address] Sensory polyneuropathy    Left leg pain    Numbness and tingling of both feet       Current Outpatient Medications   Medication Sig    cefdinir (OMNICEF) 300 MG capsule     sulfamethoxazole-trimethoprim (BACTRIM) 400-80 MG per tablet Take 1 tablet by mouth 2 times daily for 14 days    predniSONE (DELTASONE) 20 MG tablet 60 mg (3 tabs) PO once daily for 7 days; 40 mg (2 tabs) for 3 days; 20 mg (1 tab) for 2 days; 10 mg (1/2 tab) for 2 days    fluticasone (FLONASE) 50 MCG/ACT nasal spray 2 sprays by Each Nostril route daily    divalproex (DEPAKOTE ER) 500 MG extended release tablet 2 qhs. Depakote ER medically necessary due to compliance difficulties resulted in epi status. lamoTRIgine (LAMICTAL) 25 MG tablet 1 qhs x 1 week, 1 bid x 1wk, 1 qam & 2 qhs x 1wk, then 2 bid, increase by 25 mg weekly. Call for Rx 100 mg to replace for 4 tabs of 25 mg when ready, once reach Lamictal 100 mg bid reduce depakote to 1 tab qhs. Continue to titrate lamictal to 150 mg bid final, by then discontinue depakote. amphetamine-dextroamphetamine (ADDERALL) 30 MG tablet Take 30 mg by mouth in the morning and at bedtime. Multiple Vitamins-Minerals (CENTRUM WOMEN PO) Take 1 tablet by mouth daily    DULoxetine (CYMBALTA) 60 MG extended release capsule Take 60 mg by mouth 2 times daily    gabapentin (NEURONTIN) 600 MG tablet Take 600 mg by mouth 3 times daily. risperiDONE (RISPERDAL) 0.25 MG tablet Take 0.25 mg by mouth in the morning and at bedtime     No current facility-administered medications for this visit. History reviewed. No pertinent past medical history. History reviewed. No pertinent surgical history. Social History     Tobacco Use    Smoking status: Every Day     Types: Cigarettes    Smokeless tobacco: Never   Substance Use Topics    Alcohol use: Not on file       History reviewed. No pertinent family history. ROS:    Review of Systems   Constitutional:  Negative for activity change.    HENT:  Positive for postnasal drip and tinnitus. Negative for congestion. Eyes:  Negative for discharge. Respiratory:  Negative for apnea. Cardiovascular:  Negative for chest pain. Gastrointestinal:  Negative for abdominal distention. Endocrine: Negative for cold intolerance. Genitourinary:  Negative for difficulty urinating. Musculoskeletal:  Negative for arthralgias. Skin:  Negative for color change. Allergic/Immunologic: Negative for environmental allergies. Neurological:  Negative for dizziness. Hematological:  Negative for adenopathy. Psychiatric/Behavioral:  Negative for agitation. PHYSICAL EXAM:    Pulse (!) 110   Ht 5' 8\" (1.727 m)   Wt 282 lb (127.9 kg)   SpO2 98%   BMI 42.88 kg/m²     Physical Exam  Vitals and nursing note reviewed. Constitutional:       Appearance: Normal appearance. HENT:      Head: Normocephalic and atraumatic. Right Ear: Ear canal and external ear normal. A middle ear effusion is present. There is no impacted cerumen. Tympanic membrane is erythematous and retracted. Tympanic membrane is not scarred or perforated. Left Ear: Ear canal and external ear normal. A middle ear effusion is present. There is no impacted cerumen. Tympanic membrane is erythematous and retracted. Tympanic membrane is not scarred or perforated. Ears:      Comments: Bilateral EACs with copious old crusted debris left greater than the right carefully debrided under microscopy and onto the external surface of the TM that was also debrided. This revealed otherwise TMs that were intact but with hyperemia and obvious middle ear effusion. no perforations. Nose: Septal deviation present. No congestion or rhinorrhea. Right Turbinates: Enlarged and swollen. Left Turbinates: Enlarged and swollen. Comments: Anterior rhinoscopy reveals bilateral moderate DNS and large inferior turbinate hypertrophy with a boggy edematous appearance.      Mouth/Throat:      Mouth: Mucous membranes are moist.      Pharynx: Oropharynx is clear. No oropharyngeal exudate or posterior oropharyngeal erythema. Eyes:      General: No scleral icterus. Pulmonary:      Effort: Pulmonary effort is normal.   Musculoskeletal:      Cervical back: Normal range of motion and neck supple. No rigidity. Lymphadenopathy:      Cervical: No cervical adenopathy. Skin:     General: Skin is warm and dry. Neurological:      Mental Status: She is alert and oriented to person, place, and time. Psychiatric:         Mood and Affect: Mood normal.         Behavior: Behavior normal.          PROCEDURE: Cerumen removal under binocular microscopy  INDICATIONS: Cerumen impaction  DESCRIPTION: After verbal consent was obtained and a timeout was performed, the otologic microscope was used to visualize both ears. There were normal appearing auricles bilaterally. There was cerumen impaction bilaterally with wax and old crusted debris. Ear cleaning performed under the scope w/ a right angle hook,alligator forceps, and allison suctions. After cleaning, the ear canal skin was healthy and both TMs were intact w/ no perforations. Both middle ear spaces were clear w/ no effusions. The patient tolerated the procedure well and there were no complications. ASSESSMENT and PLAN        ICD-10-CM    1. Bilateral otitis media with effusion  H65.93       2. Acute swimmer's ear of both sides  H60.333       3. Acute non-recurrent sinusitis, unspecified location  J01.90       4. Bilateral impacted cerumen  H61.23 REMOVE IMPACTED EAR WAX          She had significant bilateral EAC crusting left greater than right that was debrided carefully under microscopy revealing a TM that was intact but with hyperemia and obvious middle ear effusion. She does have mild bilateral DNS on rhinoscopy with increased nasal turbinate hypertrophy and edematous changes.     We discussed pathophysiology of acute sinusitis in regards to eustachian tube dysfunction and her findings of otitis media with middle ear effusion. She also had significant crusting debrided from the bilateral EACs and external surface of the TMs indicative of a resolving otitis externa/swimmer's ear. For these findings I have recommended an additional antibiotic to optimize her sinusitis and otitis media with Bactrim twice daily for 2 weeks as well as a high-dose to low-dose tapering prednisone. She should also initiate daily Flonase and saline misting sprays. Follow-up in the office in 3 weeks for ear check to ensure middle ear effusion has resolved, sinus endoscopy and comprehensive hearing testing.     Marlon Kern,   12/1/2022

## 2022-12-12 ENCOUNTER — TELEPHONE (OUTPATIENT)
Dept: NEUROLOGY | Age: 47
End: 2022-12-12

## 2022-12-13 ENCOUNTER — TELEPHONE (OUTPATIENT)
Dept: NEUROLOGY | Age: 47
End: 2022-12-13

## 2022-12-13 DIAGNOSIS — G35 MULTIPLE SCLEROSIS (HCC): Primary | ICD-10-CM

## 2022-12-13 NOTE — TELEPHONE ENCOUNTER
I did speak with pharm rep and she discussed that they were needing to know from you if you wanted both EKG and eye exam completed if so, it wouldn't be a part of the in home pre test due to the pt not having commercial insurance. Pt does have [employer]. Please Advise.

## 2022-12-14 ENCOUNTER — TELEPHONE (OUTPATIENT)
Dept: NEUROLOGY | Age: 47
End: 2022-12-14

## 2023-01-03 PROBLEM — Z91.199 NO-SHOW FOR APPOINTMENT: Status: ACTIVE | Noted: 2023-01-03

## 2023-01-03 RX ORDER — PREDNISONE 20 MG/1
TABLET ORAL
Qty: 16 TABLET | Refills: 0 | Status: SHIPPED | OUTPATIENT
Start: 2023-01-03

## 2023-01-03 RX ORDER — CIPROFLOXACIN AND DEXAMETHASONE 3; 1 MG/ML; MG/ML
4 SUSPENSION/ DROPS AURICULAR (OTIC) 2 TIMES DAILY
Qty: 7.5 ML | Refills: 1 | Status: SHIPPED | OUTPATIENT
Start: 2023-01-03 | End: 2023-01-08

## 2023-01-03 RX ORDER — CEFDINIR 300 MG/1
600 CAPSULE ORAL 2 TIMES DAILY
Qty: 28 CAPSULE | Refills: 0 | Status: SHIPPED | OUTPATIENT
Start: 2023-01-03 | End: 2023-01-10

## 2023-01-25 ENCOUNTER — TELEPHONE (OUTPATIENT)
Dept: NEUROLOGY | Age: 48
End: 2023-01-25

## 2023-01-25 NOTE — TELEPHONE ENCOUNTER
Called patient to f/u to see if she had her  EKG and question the missed appt/ no show she had. Asked to return the call.

## 2023-01-27 DIAGNOSIS — G35 MULTIPLE SCLEROSIS (HCC): Primary | ICD-10-CM

## 2023-01-27 RX ORDER — PONESIMOD 20 MG/1
20 TABLET, FILM COATED ORAL DAILY
Qty: 30 TABLET | Refills: 3 | Status: SHIPPED | OUTPATIENT
Start: 2023-01-27

## 2023-01-30 NOTE — TELEPHONE ENCOUNTER
Called patient no answer left VM to call back. Trying to f/u to see is she had EKG and eye examination completed.      Documentation sent to [address] [place] [address] attn: Ewelina Saravia

## 2023-02-01 ENCOUNTER — TELEPHONE (OUTPATIENT)
Dept: NEUROLOGY | Age: 48
End: 2023-02-01

## 2023-02-01 DIAGNOSIS — G35 MULTIPLE SCLEROSIS (HCC): Primary | ICD-10-CM

## 2023-02-01 RX ORDER — PONESIMOD 20 MG/1
20 TABLET, FILM COATED ORAL DAILY
Qty: 30 TABLET | Refills: 3 | Status: CANCELLED | OUTPATIENT
Start: 2023-02-01

## 2023-02-01 NOTE — TELEPHONE ENCOUNTER
Called patient left VM that a PA was put in and it is currently under review. It usually takes 5-7 business days to hear something and she will be contacted once its been decided.

## 2023-02-02 ENCOUNTER — TELEPHONE (OUTPATIENT)
Dept: NEUROLOGY | Age: 48
End: 2023-02-02

## 2023-02-02 NOTE — TELEPHONE ENCOUNTER
Spoke with patient about her EKG and EYE exam for the PONVORY. Stated she will have the eye exam on Monday and will have to get an appt for the EKG. Adv once she get those test completed her medication would be called in.  Verbalized understanding

## 2023-02-08 ENCOUNTER — TELEPHONE (OUTPATIENT)
Dept: NEUROLOGY | Age: 48
End: 2023-02-08

## 2023-02-08 DIAGNOSIS — G35 MULTIPLE SCLEROSIS EXACERBATION (HCC): Primary | ICD-10-CM

## 2023-02-08 NOTE — TELEPHONE ENCOUNTER
Pt calling stating she feels she is having a bad MS flare up .  She is asking if Dr Sandra Reza can send in her some steroids

## 2023-02-08 NOTE — TELEPHONE ENCOUNTER
Orders Placed This Encounter   Procedures    MRI BRAIN W WO CONTRAST     Standing Status:   Future     Standing Expiration Date:   2/8/2024     Order Specific Question:   STAT Creatinine as needed:     Answer:   No     Order Specific Question:   Reason for exam:     Answer:   c/o weakness concerning MS relapse    MRI CERVICAL SPINE W WO CONTRAST     Standing Status:   Future     Standing Expiration Date:   2/8/2024     Order Specific Question:   STAT Creatinine as needed:     Answer:   No     Order Specific Question:   Reason for exam:     Answer:   c/o weakness concerning MS relapse    CBC with Auto Differential     Standing Status:   Future     Standing Expiration Date:   2/8/2024    Urinalysis with Reflex to Culture     Standing Status:   Future     Standing Expiration Date:   2/8/2024     Order Specific Question:   SPECIFY(EX-CATH,MIDSTREAM,CYSTO,ETC)?      Answer:   midstream

## 2023-02-08 NOTE — TELEPHONE ENCOUNTER
Spoke with patient advised of the test that was ordered for her. Also gave her the number to Radiology Scheduling to call if she hasn't heard anything in a reasonable amount of time. Advised if she got any worse to go the the  ER. Patient verbalized understanding.

## 2023-02-09 ENCOUNTER — TELEPHONE (OUTPATIENT)
Dept: NEUROLOGY | Age: 48
End: 2023-02-09

## 2023-02-09 NOTE — TELEPHONE ENCOUNTER
Patient called stating her MRI is scheduled for 2/24/23, but she needs a steroid to help with her symptoms for MS. She would like a Steroid called in.  Please advise

## 2023-02-12 DIAGNOSIS — G35 MULTIPLE SCLEROSIS EXACERBATION (HCC): Primary | ICD-10-CM

## 2023-03-01 ENCOUNTER — TELEPHONE (OUTPATIENT)
Dept: NEUROLOGY | Age: 48
End: 2023-03-01

## 2023-03-07 ENCOUNTER — TELEPHONE (OUTPATIENT)
Dept: NEUROLOGY | Age: 48
End: 2023-03-07

## 2023-03-07 NOTE — TELEPHONE ENCOUNTER
Maddy Raymond from [employer] called in stating that they needed clarification on medication. They can be reached at [phone].

## 2023-03-08 ENCOUNTER — CLINICAL DOCUMENTATION (OUTPATIENT)
Dept: NEUROLOGY | Age: 48
End: 2023-03-08

## 2023-03-08 NOTE — PROGRESS NOTES
Called [facility] spoke to Pharmacist Diana Lopez to confirm the prescription for PONVORY for the patient

## 2024-01-02 ENCOUNTER — TELEPHONE (OUTPATIENT)
Dept: NEUROLOGY | Age: 49
End: 2024-01-02

## 2024-01-22 DIAGNOSIS — G40.409 GRAND MAL SEIZURE (HCC): ICD-10-CM

## 2024-01-23 RX ORDER — DIVALPROEX SODIUM 500 MG/1
TABLET, EXTENDED RELEASE ORAL
Qty: 180 TABLET | Refills: 1 | Status: SHIPPED | OUTPATIENT
Start: 2024-01-23

## 2024-04-23 ENCOUNTER — OFFICE VISIT (OUTPATIENT)
Dept: NEUROLOGY | Age: 49
End: 2024-04-23

## 2024-04-23 VITALS
BODY MASS INDEX: 36.31 KG/M2 | DIASTOLIC BLOOD PRESSURE: 95 MMHG | HEIGHT: 68 IN | OXYGEN SATURATION: 99 % | SYSTOLIC BLOOD PRESSURE: 129 MMHG | HEART RATE: 102 BPM | WEIGHT: 239.6 LBS

## 2024-04-23 DIAGNOSIS — Z86.69 HISTORY OF SEIZURE DISORDER: Primary | ICD-10-CM

## 2024-04-23 DIAGNOSIS — G35 MULTIPLE SCLEROSIS (HCC): ICD-10-CM

## 2024-04-23 DIAGNOSIS — R53.1 RIGHT SIDED WEAKNESS: ICD-10-CM

## 2024-04-23 DIAGNOSIS — G40.89 TONIC SEIZURE (HCC): ICD-10-CM

## 2024-04-23 DIAGNOSIS — M79.2 NEUROPATHIC PAIN: ICD-10-CM

## 2024-04-23 RX ORDER — GABAPENTIN 600 MG/1
600 TABLET ORAL 4 TIMES DAILY
Qty: 180 TABLET | Refills: 1 | Status: SHIPPED | OUTPATIENT
Start: 2024-04-23 | End: 2024-07-22

## 2024-04-23 RX ORDER — MELOXICAM 15 MG/1
15 TABLET ORAL DAILY
COMMUNITY
Start: 2023-11-29

## 2024-04-23 ASSESSMENT — PATIENT HEALTH QUESTIONNAIRE - PHQ9
SUM OF ALL RESPONSES TO PHQ QUESTIONS 1-9: 0
1. LITTLE INTEREST OR PLEASURE IN DOING THINGS: NOT AT ALL
2. FEELING DOWN, DEPRESSED OR HOPELESS: NOT AT ALL
SUM OF ALL RESPONSES TO PHQ9 QUESTIONS 1 & 2: 0

## 2024-04-23 ASSESSMENT — ENCOUNTER SYMPTOMS
ABDOMINAL DISTENTION: 0
RHINORRHEA: 0
SHORTNESS OF BREATH: 0
COUGH: 0
EYE DISCHARGE: 0
SORE THROAT: 0

## 2024-07-02 RX ORDER — GABAPENTIN 600 MG/1
600 TABLET ORAL 4 TIMES DAILY
Qty: 180 TABLET | Refills: 3 | Status: SHIPPED | OUTPATIENT
Start: 2024-07-02 | End: 2024-12-29

## 2024-08-28 ENCOUNTER — HOSPITAL ENCOUNTER (EMERGENCY)
Age: 49
Discharge: HOME | End: 2024-08-29
Attending: EMERGENCY MEDICINE
Payer: MEDICARE

## 2024-08-28 DIAGNOSIS — S61.012A LACERATION OF LEFT THUMB WITHOUT FOREIGN BODY WITHOUT DAMAGE TO NAIL, INITIAL ENCOUNTER: Primary | ICD-10-CM

## 2024-08-28 PROCEDURE — 90471 IMMUNIZATION ADMIN: CPT | Performed by: EMERGENCY MEDICINE

## 2024-08-28 PROCEDURE — 99284 EMERGENCY DEPT VISIT MOD MDM: CPT

## 2024-08-28 PROCEDURE — 6360000002 HC RX W HCPCS: Performed by: EMERGENCY MEDICINE

## 2024-08-28 PROCEDURE — 2500000003 HC RX 250 WO HCPCS: Performed by: EMERGENCY MEDICINE

## 2024-08-28 PROCEDURE — 12002 RPR S/N/AX/GEN/TRNK2.6-7.5CM: CPT

## 2024-08-28 PROCEDURE — 6370000000 HC RX 637 (ALT 250 FOR IP): Performed by: EMERGENCY MEDICINE

## 2024-08-28 PROCEDURE — 90714 TD VACC NO PRESV 7 YRS+ IM: CPT | Performed by: EMERGENCY MEDICINE

## 2024-08-28 RX ORDER — ONDANSETRON 4 MG/1
4 TABLET, ORALLY DISINTEGRATING ORAL
Status: COMPLETED | OUTPATIENT
Start: 2024-08-29 | End: 2024-08-28

## 2024-08-28 RX ORDER — HYDROCODONE BITARTRATE AND ACETAMINOPHEN 5; 325 MG/1; MG/1
1 TABLET ORAL
Status: COMPLETED | OUTPATIENT
Start: 2024-08-28 | End: 2024-08-28

## 2024-08-28 RX ADMIN — LIDOCAINE HYDROCHLORIDE 5 ML: 10 INJECTION, SOLUTION INFILTRATION; PERINEURAL at 23:17

## 2024-08-28 RX ADMIN — CLOSTRIDIUM TETANI TOXOID ANTIGEN (FORMALDEHYDE INACTIVATED) AND CORYNEBACTERIUM DIPHTHERIAE TOXOID ANTIGEN (FORMALDEHYDE INACTIVATED) 0.5 ML: 5; 2 INJECTION, SUSPENSION INTRAMUSCULAR at 23:13

## 2024-08-28 RX ADMIN — ONDANSETRON 4 MG: 4 TABLET, ORALLY DISINTEGRATING ORAL at 23:57

## 2024-08-28 RX ADMIN — HYDROCODONE BITARTRATE AND ACETAMINOPHEN 1 TABLET: 5; 325 TABLET ORAL at 23:13

## 2024-08-28 ASSESSMENT — PAIN - FUNCTIONAL ASSESSMENT: PAIN_FUNCTIONAL_ASSESSMENT: 0-10

## 2024-08-28 ASSESSMENT — PAIN SCALES - GENERAL: PAINLEVEL_OUTOF10: 6

## 2024-08-29 VITALS
HEART RATE: 107 BPM | TEMPERATURE: 98.8 F | BODY MASS INDEX: 37.77 KG/M2 | RESPIRATION RATE: 18 BRPM | OXYGEN SATURATION: 95 % | WEIGHT: 235 LBS | HEIGHT: 66 IN | DIASTOLIC BLOOD PRESSURE: 87 MMHG | SYSTOLIC BLOOD PRESSURE: 149 MMHG

## 2024-08-29 NOTE — ED PROVIDER NOTES
Emergency Department Provider Note       PCP: Annabel Kenny APRN - NP   Age: 49 y.o.   Sex: female     DISPOSITION Decision To Discharge 08/28/2024 11:49:36 PM  Condition at Disposition: Stable       ICD-10-CM    1. Laceration of left thumb without foreign body without damage to nail, initial encounter  S61.012A           Medical Decision Making     Wound repaired.  Tetanus updated.  Given return precautions.     1 acute, uncomplicated illness or injury.  Shared medical decision making was utilized in creating the patients health plan today.    I independently ordered and reviewed each unique test.                       History     49-year-old female presents with laceration to the her left thumb that occurred while removing a sheath from a  knife.  Unknown last tetanus.  Denies other injuries.  She had difficulty getting bleeding controlled.  No numbness or tingling.        Physical Exam     Vitals signs and nursing note reviewed:  Vitals:    08/28/24 2022 08/28/24 2358 08/29/24 0001   BP: (!) 136/102 (!) 149/87    Pulse: (!) 107     Resp: 18     Temp: 98.8 °F (37.1 °C)     TempSrc: Oral     SpO2: 96% 98% 95%   Weight: 106.6 kg (235 lb)     Height: 1.676 m (5' 6\")        Physical Exam  Vitals and nursing note reviewed.   Constitutional:       Appearance: Normal appearance. She is well-developed.   HENT:      Head: Normocephalic and atraumatic.      Nose: Nose normal.      Mouth/Throat:      Mouth: Mucous membranes are moist.   Eyes:      Extraocular Movements: Extraocular movements intact.      Pupils: Pupils are equal, round, and reactive to light.   Cardiovascular:      Rate and Rhythm: Normal rate and regular rhythm.   Pulmonary:      Effort: Pulmonary effort is normal. No respiratory distress.   Abdominal:      General: Abdomen is flat. There is no distension.   Musculoskeletal:         General: Normal range of motion.      Left hand: Laceration present. No swelling. Normal range of motion.   Area cleansed with:  Povidone-iodine and Shur-Clens    Amount of cleaning:  Standard    Irrigation solution:  Sterile saline    Irrigation method:  Syringe    Visualized foreign bodies/material removed: no      Debridement:  None  Skin repair:     Repair method:  Sutures    Suture size:  4-0    Wound skin closure material used: ethilon.    Suture technique:  Simple interrupted    Number of sutures:  7  Approximation:     Approximation:  Close  Repair type:     Repair type:  Simple  Post-procedure details:     Dressing:  Antibiotic ointment, non-adherent dressing and bulky dressing    Procedure completion:  Tolerated well, no immediate complications      Orders Placed This Encounter   Procedures    LACERATION REPAIR    Wound dressing        Medications given during this emergency department visit:  Medications   tetanus & diphtheria toxoids (adult) 5-2 LFU injection 0.5 mL (0.5 mLs IntraMUSCular Given 8/28/24 2313)   HYDROcodone-acetaminophen (NORCO) 5-325 MG per tablet 1 tablet (1 tablet Oral Given 8/28/24 2313)   lidocaine 1 % injection 5 mL (5 mLs IntraDERmal Given 8/28/24 2317)   ondansetron (ZOFRAN-ODT) disintegrating tablet 4 mg (4 mg Oral Given 8/28/24 7583)       Discharge Medication List as of 8/28/2024 11:54 PM           No past medical history on file.     No past surgical history on file.     Social History     Socioeconomic History    Marital status:    Tobacco Use    Smoking status: Every Day     Types: Cigarettes    Smokeless tobacco: Never     Social Determinants of Health     Financial Resource Strain: Low Risk  (7/11/2024)    Received from [facility]    Overall Financial Resource Strain (CARDIA)     Difficulty of Paying Living Expenses: Not hard at all   Food Insecurity: No Food Insecurity (7/10/2024)    Received from [facility]    Hunger Vital Sign     Worried About Running Out of Food in the Last Year: Never true     Ran Out of Food in the Last Year: Never true   Transportation Needs: No

## 2024-08-29 NOTE — ED TRIAGE NOTES
PT ambulatory to triage with steady gait by self. PT presents with laceration to inside left thumb. Gauze and coband applied by triage nurse. Bleeding controlled. PT reports cutting thumb on \"dirty grilling knife\" while loading the . Unknown last tetanus. No pain meds pta.

## 2024-08-29 NOTE — DISCHARGE INSTRUCTIONS
Change dressing daily with thin layer of antibiotic ointment.  Return for signs of infection or other worsening or concerning symptoms.  Return in 10 days for suture removal.